# Patient Record
Sex: MALE | Race: WHITE | NOT HISPANIC OR LATINO | Employment: PART TIME | URBAN - NONMETROPOLITAN AREA
[De-identification: names, ages, dates, MRNs, and addresses within clinical notes are randomized per-mention and may not be internally consistent; named-entity substitution may affect disease eponyms.]

---

## 2021-01-12 ENCOUNTER — IMMUNIZATION (OUTPATIENT)
Dept: VACCINE CLINIC | Facility: HOSPITAL | Age: 41
End: 2021-01-12

## 2021-01-12 PROCEDURE — 0011A: CPT | Performed by: INTERNAL MEDICINE

## 2021-01-12 PROCEDURE — 91301 HC SARSCO02 VAC 100MCG/0.5ML IM: CPT | Performed by: INTERNAL MEDICINE

## 2021-02-09 ENCOUNTER — IMMUNIZATION (OUTPATIENT)
Dept: VACCINE CLINIC | Facility: HOSPITAL | Age: 41
End: 2021-02-09

## 2021-02-09 PROCEDURE — 91301 HC SARSCO02 VAC 100MCG/0.5ML IM: CPT | Performed by: INTERNAL MEDICINE

## 2021-02-09 PROCEDURE — 0012A: CPT | Performed by: INTERNAL MEDICINE

## 2022-02-08 ENCOUNTER — OFFICE VISIT (OUTPATIENT)
Dept: ORTHOPEDIC SURGERY | Facility: CLINIC | Age: 42
End: 2022-02-08

## 2022-02-08 VITALS — BODY MASS INDEX: 37.55 KG/M2 | HEIGHT: 71 IN | RESPIRATION RATE: 18 BRPM | WEIGHT: 268.2 LBS

## 2022-02-08 DIAGNOSIS — R29.898 KNEE CLICKING: ICD-10-CM

## 2022-02-08 DIAGNOSIS — M25.561 ARTHRALGIA OF RIGHT KNEE: Primary | ICD-10-CM

## 2022-02-08 PROCEDURE — 99203 OFFICE O/P NEW LOW 30 MIN: CPT | Performed by: PHYSICIAN ASSISTANT

## 2022-02-08 NOTE — PROGRESS NOTES
Subjective   Patient ID: Harmeet Banda is a 41 y.o. right hand dominant male  Pain of the Right Knee (Reports in 2021, he was hiking in full gear while on  duty and twisted knee when he stepped on uneven ground, no tx)         History of Present Illness    Presents with right knee pain that began 2021 while hiking on eneven terrain with full gear on  duty. He has since noticed pain, swelling and clicking to right knee.   On occasion he feels like the right knee wants to give out.         History reviewed. No pertinent past medical history.     Past Surgical History:   Procedure Laterality Date   • TONSILLECTOMY AND ADENOIDECTOMY         Family History   Problem Relation Age of Onset   • Diabetes Mother    • Diabetes Maternal Grandmother    • Diabetes Maternal Grandfather    • Cancer Maternal Grandfather    • Cancer Paternal Grandmother         colon       Social History     Socioeconomic History   • Marital status: Single   Tobacco Use   • Smoking status: Former Smoker     Years: 10.00     Quit date:      Years since quittin.1   • Smokeless tobacco: Never Used   Vaping Use   • Vaping Use: Never used   Substance and Sexual Activity   • Alcohol use: Yes     Comment: socially   • Drug use: Defer   • Sexual activity: Defer       No current outpatient medications on file.    No Known Allergies    Review of Systems   Constitutional: Negative for diaphoresis, fever and unexpected weight change.   HENT: Negative for dental problem and sore throat.    Eyes: Negative for visual disturbance.   Respiratory: Negative for shortness of breath.    Cardiovascular: Negative for chest pain.   Gastrointestinal: Negative for abdominal pain, constipation, diarrhea, nausea and vomiting.   Genitourinary: Negative for difficulty urinating and frequency.   Musculoskeletal: Positive for arthralgias (right knee).   Neurological: Negative for headaches.   Hematological: Does not bruise/bleed easily.  "      I have reviewed the medical and surgical history, family history, social history, medications, and/or allergies, and the review of systems of this report.    Objective   Resp 18   Ht 180.3 cm (71\")   Wt 122 kg (268 lb 3.2 oz)   BMI 37.41 kg/m²    Physical Exam  Vitals and nursing note reviewed.   Cardiovascular:      Pulses: Normal pulses.   Musculoskeletal:      Right knee:      Instability Tests: Medial Rayo test positive. Lateral Rayo test negative.   Neurological:      Mental Status: He is oriented to person, place, and time.       Right Knee Exam     Range of Motion   Extension: 0   Flexion: 120     Tests   Rayo:  Medial - positive Lateral - negative  Varus: negative Valgus: negative  Drawer:  Anterior - negative    Posterior - negative    Other   Erythema: absent  Sensation: normal  Pulse: present           Extremity DVT signs are negative on physical exam with negative Mona sign, no calf pain, no palpable cords and no skin tone change   Neurologic Exam     Mental Status   Oriented to person, place, and time.              Assessment/Plan   Independent Review of Radiographic Studies:    X-ray of the right knee 2 view weightbearing performed in the office independently reviewed for the evaluation of knee pain.  No comparison films are available reviewed.  No acute fracture.  There does appear to be mild patellofemoral joint space narrowing      Procedures       Diagnoses and all orders for this visit:    1. Arthralgia of right knee (Primary)  -     XR Knee 1 or 2 View Right; Future  -     MRI Knee Right Without Contrast    2. Knee clicking  -     MRI Knee Right Without Contrast       Orthopedic activities reviewed and patient expressed appreciation  Discussion of orthopedic goals  Risk, benefits, and merits of treatment alternatives reviewed with the patient and questions answered    Recommendations/Plan:  Exercise, medications, injections, other patient advice, and return appointment as " noted.  Patient is encouraged to call or return for any issues or concerns.    Follow up after mri     Patient agreeable to call or return sooner for any concerns.                 EMR Dragon-transcription disclaimer:  This encounter note is an electronic transcription of spoken language to printed text.  Electronic transcription of spoken language may permit erroneous or at times nonsensical words or phrases to be inadvertently transcribed.  Although I have reviewed the note for such errors, some may still exist

## 2022-02-09 ENCOUNTER — TELEPHONE (OUTPATIENT)
Dept: ORTHOPEDIC SURGERY | Facility: CLINIC | Age: 42
End: 2022-02-09

## 2022-02-09 ENCOUNTER — HOSPITAL ENCOUNTER (OUTPATIENT)
Dept: MRI IMAGING | Facility: HOSPITAL | Age: 42
Discharge: HOME | End: 2022-02-09
Admitting: PHYSICIAN ASSISTANT

## 2022-02-09 PROCEDURE — 73721 MRI JNT OF LWR EXTRE W/O DYE: CPT

## 2022-02-09 NOTE — TELEPHONE ENCOUNTER
Caller: PATIENT     Relationship: SELF     Best call back number: [phone]    Caller requesting test results: PATIENT     What test was performed: MRI OF LEFT KNEE     When was the test performed: 02/09/22- TODAY    Where was the test performed: HOME     Additional notes: PT. GOT IN FOR MRI OF LEFT KNEE TODAY.   ASKING IF [facility] WILL PLEASE CALL HIM WHEN HE GETS RESULTS.

## 2022-02-10 ENCOUNTER — TELEPHONE (OUTPATIENT)
Dept: ORTHOPEDIC SURGERY | Facility: CLINIC | Age: 42
End: 2022-02-10

## 2022-02-10 ENCOUNTER — OFFICE VISIT (OUTPATIENT)
Dept: ORTHOPEDIC SURGERY | Facility: CLINIC | Age: 42
End: 2022-02-10

## 2022-02-10 DIAGNOSIS — M25.561 ARTHRALGIA OF RIGHT KNEE: Primary | ICD-10-CM

## 2022-02-10 NOTE — PROGRESS NOTES
..You have chosen to receive care through a telephone visit. Do you consent to use a telephone visit for your medical care today? Yes

## 2022-02-10 NOTE — TELEPHONE ENCOUNTER
I reviewed the MRI results with the patient via telephone.  There was no evidence of ligament or meniscal tear.  I explained to him I do feel the mechanics of his knee could be the main culprit in his symptoms.  I would like for him to try a cortisone injection in the right knee for patellofemoral disorder as well as formal physical therapy for quad VMO strengthening.   Principal Discharge DX:	Acute congestive heart failure, unspecified heart failure type  Secondary Diagnosis:	Pancytopenia  Secondary Diagnosis:	Metabolic acidosis

## 2022-02-25 ENCOUNTER — OFFICE VISIT (OUTPATIENT)
Dept: INTERNAL MEDICINE | Facility: CLINIC | Age: 42
End: 2022-02-25

## 2022-02-25 VITALS
HEART RATE: 101 BPM | SYSTOLIC BLOOD PRESSURE: 160 MMHG | OXYGEN SATURATION: 99 % | TEMPERATURE: 97.1 F | DIASTOLIC BLOOD PRESSURE: 108 MMHG | HEIGHT: 71 IN | WEIGHT: 269 LBS | BODY MASS INDEX: 37.66 KG/M2

## 2022-02-25 DIAGNOSIS — K76.89 LIVER CYST: ICD-10-CM

## 2022-02-25 DIAGNOSIS — K76.0 NAFLD (NONALCOHOLIC FATTY LIVER DISEASE): ICD-10-CM

## 2022-02-25 DIAGNOSIS — Z76.89 ENCOUNTER TO ESTABLISH CARE: Primary | ICD-10-CM

## 2022-02-25 DIAGNOSIS — R68.89 DECREASED INTEREST IN ACTIVITIES: ICD-10-CM

## 2022-02-25 DIAGNOSIS — Z80.8 FAMILY HISTORY OF SKIN CANCER: ICD-10-CM

## 2022-02-25 DIAGNOSIS — R74.01 ELEVATED ALT MEASUREMENT: ICD-10-CM

## 2022-02-25 DIAGNOSIS — I10 HYPERTENSION, UNSPECIFIED TYPE: ICD-10-CM

## 2022-02-25 DIAGNOSIS — R74.01 ELEVATED AST (SGOT): ICD-10-CM

## 2022-02-25 DIAGNOSIS — R53.83 FATIGUE, UNSPECIFIED TYPE: ICD-10-CM

## 2022-02-25 DIAGNOSIS — F32.0 CURRENT MILD EPISODE OF MAJOR DEPRESSIVE DISORDER WITHOUT PRIOR EPISODE: ICD-10-CM

## 2022-02-25 DIAGNOSIS — E55.9 VITAMIN D DEFICIENCY: ICD-10-CM

## 2022-02-25 DIAGNOSIS — R63.5 WEIGHT GAIN, ABNORMAL: ICD-10-CM

## 2022-02-25 DIAGNOSIS — Z13.0 SCREENING FOR DISORDER OF BLOOD AND BLOOD-FORMING ORGANS: ICD-10-CM

## 2022-02-25 DIAGNOSIS — K14.5 TONGUE, FISSURED: ICD-10-CM

## 2022-02-25 DIAGNOSIS — Z87.2 HISTORY OF BLISTERING SUNBURN: ICD-10-CM

## 2022-02-25 DIAGNOSIS — L98.9 LESION OF SKIN OF SCALP: ICD-10-CM

## 2022-02-25 PROCEDURE — 99204 OFFICE O/P NEW MOD 45 MIN: CPT | Performed by: NURSE PRACTITIONER

## 2022-02-25 PROCEDURE — 93000 ELECTROCARDIOGRAM COMPLETE: CPT | Performed by: NURSE PRACTITIONER

## 2022-02-25 RX ORDER — BUPROPION HYDROCHLORIDE 150 MG/1
150 TABLET ORAL DAILY
Qty: 30 TABLET | Refills: 1 | Status: SHIPPED | OUTPATIENT
Start: 2022-02-25 | End: 2022-03-23 | Stop reason: SDUPTHER

## 2022-02-25 RX ORDER — LISINOPRIL 20 MG/1
20 TABLET ORAL DAILY
Qty: 30 TABLET | Refills: 1 | Status: SHIPPED | OUTPATIENT
Start: 2022-02-25 | End: 2022-02-28

## 2022-02-26 LAB
25(OH)D3+25(OH)D2 SERPL-MCNC: 16.1 NG/ML (ref 30–100)
ALBUMIN SERPL-MCNC: 4.8 G/DL (ref 3.5–5.2)
ALBUMIN/GLOB SERPL: 2.1 G/DL
ALP SERPL-CCNC: 91 U/L (ref 39–117)
ALT SERPL-CCNC: 77 U/L (ref 1–41)
AST SERPL-CCNC: 44 U/L (ref 1–40)
BASOPHILS # BLD AUTO: 0.03 10*3/MM3 (ref 0–0.2)
BASOPHILS NFR BLD AUTO: 0.4 % (ref 0–1.5)
BILIRUB SERPL-MCNC: 0.8 MG/DL (ref 0–1.2)
BUN SERPL-MCNC: 11 MG/DL (ref 6–20)
BUN/CREAT SERPL: 10.3 (ref 7–25)
CALCIUM SERPL-MCNC: 9.8 MG/DL (ref 8.6–10.5)
CHLORIDE SERPL-SCNC: 107 MMOL/L (ref 98–107)
CO2 SERPL-SCNC: 22.2 MMOL/L (ref 22–29)
CREAT SERPL-MCNC: 1.07 MG/DL (ref 0.76–1.27)
EOSINOPHIL # BLD AUTO: 0.2 10*3/MM3 (ref 0–0.4)
EOSINOPHIL NFR BLD AUTO: 2.6 % (ref 0.3–6.2)
ERYTHROCYTE [DISTWIDTH] IN BLOOD BY AUTOMATED COUNT: 12.6 % (ref 12.3–15.4)
FOLATE SERPL-MCNC: 4.3 NG/ML (ref 4.78–24.2)
GLOBULIN SER CALC-MCNC: 2.3 GM/DL
GLUCOSE SERPL-MCNC: 101 MG/DL (ref 65–99)
HCT VFR BLD AUTO: 46.7 % (ref 37.5–51)
HGB BLD-MCNC: 16.5 G/DL (ref 13–17.7)
IMM GRANULOCYTES # BLD AUTO: 0.03 10*3/MM3 (ref 0–0.05)
IMM GRANULOCYTES NFR BLD AUTO: 0.4 % (ref 0–0.5)
IRON SERPL-MCNC: 109 MCG/DL (ref 59–158)
LYMPHOCYTES # BLD AUTO: 3.83 10*3/MM3 (ref 0.7–3.1)
LYMPHOCYTES NFR BLD AUTO: 49 % (ref 19.6–45.3)
MCH RBC QN AUTO: 32.2 PG (ref 26.6–33)
MCHC RBC AUTO-ENTMCNC: 35.3 G/DL (ref 31.5–35.7)
MCV RBC AUTO: 91 FL (ref 79–97)
MONOCYTES # BLD AUTO: 0.46 10*3/MM3 (ref 0.1–0.9)
MONOCYTES NFR BLD AUTO: 5.9 % (ref 5–12)
NEUTROPHILS # BLD AUTO: 3.26 10*3/MM3 (ref 1.7–7)
NEUTROPHILS NFR BLD AUTO: 41.7 % (ref 42.7–76)
NRBC BLD AUTO-RTO: 0 /100 WBC (ref 0–0.2)
PLATELET # BLD AUTO: 241 10*3/MM3 (ref 140–450)
POTASSIUM SERPL-SCNC: 4.1 MMOL/L (ref 3.5–5.2)
PROT SERPL-MCNC: 7.1 G/DL (ref 6–8.5)
RBC # BLD AUTO: 5.13 10*6/MM3 (ref 4.14–5.8)
SODIUM SERPL-SCNC: 140 MMOL/L (ref 136–145)
T4 FREE SERPL-MCNC: 1.28 NG/DL (ref 0.93–1.7)
TSH SERPL DL<=0.005 MIU/L-ACNC: 2.3 UIU/ML (ref 0.27–4.2)
VIT B12 SERPL-MCNC: 210 PG/ML (ref 211–946)
WBC # BLD AUTO: 7.81 10*3/MM3 (ref 3.4–10.8)

## 2022-02-28 RX ORDER — LISINOPRIL 20 MG/1
20 TABLET ORAL DAILY
Qty: 90 TABLET | Refills: 1 | Status: SHIPPED | OUTPATIENT
Start: 2022-02-28 | End: 2022-03-23 | Stop reason: SINTOL

## 2022-02-28 RX ORDER — ERGOCALCIFEROL 1.25 MG/1
50000 CAPSULE ORAL WEEKLY
Qty: 12 CAPSULE | Refills: 0 | Status: SHIPPED | OUTPATIENT
Start: 2022-02-28 | End: 2022-05-17

## 2022-03-22 ENCOUNTER — HOSPITAL ENCOUNTER (OUTPATIENT)
Dept: ULTRASOUND IMAGING | Facility: HOSPITAL | Age: 42
Discharge: HOME | End: 2022-03-22
Admitting: NURSE PRACTITIONER

## 2022-03-22 DIAGNOSIS — R74.01 ELEVATED AST (SGOT): ICD-10-CM

## 2022-03-22 DIAGNOSIS — R74.01 ELEVATED ALT MEASUREMENT: ICD-10-CM

## 2022-03-22 PROCEDURE — 76705 ECHO EXAM OF ABDOMEN: CPT

## 2022-03-23 ENCOUNTER — OFFICE VISIT (OUTPATIENT)
Dept: INTERNAL MEDICINE | Facility: CLINIC | Age: 42
End: 2022-03-23

## 2022-03-23 VITALS
TEMPERATURE: 97.9 F | HEART RATE: 84 BPM | DIASTOLIC BLOOD PRESSURE: 98 MMHG | BODY MASS INDEX: 36.68 KG/M2 | WEIGHT: 262 LBS | HEIGHT: 71 IN | SYSTOLIC BLOOD PRESSURE: 138 MMHG | OXYGEN SATURATION: 99 %

## 2022-03-23 DIAGNOSIS — Z87.2 HISTORY OF BLISTERING SUNBURN: ICD-10-CM

## 2022-03-23 DIAGNOSIS — L81.8 EXTENSIVE TATTOOS: ICD-10-CM

## 2022-03-23 DIAGNOSIS — E55.9 VITAMIN D DEFICIENCY: ICD-10-CM

## 2022-03-23 DIAGNOSIS — Z80.8 FAMILY HISTORY OF BASAL CELL CARCINOMA: ICD-10-CM

## 2022-03-23 DIAGNOSIS — I10 PRIMARY HYPERTENSION: ICD-10-CM

## 2022-03-23 DIAGNOSIS — K76.0 NAFLD (NONALCOHOLIC FATTY LIVER DISEASE): ICD-10-CM

## 2022-03-23 DIAGNOSIS — F32.0 CURRENT MILD EPISODE OF MAJOR DEPRESSIVE DISORDER WITHOUT PRIOR EPISODE: ICD-10-CM

## 2022-03-23 DIAGNOSIS — K76.89 LIVER CYST: ICD-10-CM

## 2022-03-23 DIAGNOSIS — E66.01 CLASS 2 SEVERE OBESITY DUE TO EXCESS CALORIES WITH SERIOUS COMORBIDITY AND BODY MASS INDEX (BMI) OF 36.0 TO 36.9 IN ADULT: ICD-10-CM

## 2022-03-23 DIAGNOSIS — Z00.00 ANNUAL PHYSICAL EXAM: Primary | ICD-10-CM

## 2022-03-23 PROCEDURE — 99396 PREV VISIT EST AGE 40-64: CPT | Performed by: NURSE PRACTITIONER

## 2022-03-23 RX ORDER — LOSARTAN POTASSIUM 100 MG/1
100 TABLET ORAL DAILY
Qty: 90 TABLET | Refills: 1 | Status: SHIPPED | OUTPATIENT
Start: 2022-03-23 | End: 2022-10-05 | Stop reason: SDUPTHER

## 2022-03-23 RX ORDER — BUPROPION HYDROCHLORIDE 150 MG/1
150 TABLET ORAL DAILY
Qty: 90 TABLET | Refills: 1 | Status: SHIPPED | OUTPATIENT
Start: 2022-03-23 | End: 2022-10-05 | Stop reason: SDUPTHER

## 2022-03-23 NOTE — PROGRESS NOTES
Chief Complaint   Patient presents with   • Annual Exam       Harmeet Banda is a 42 y.o. male and is here for a comprehensive physical exam.     HTN: taking lisinopril 20 mg daily since prescribed on 2/25/22.  Has noticed a slight irritating cough, phlegm and nasal congestion have increased since he started taking it. Checks BP at home, has been improved but not WNL.  Denies chest pain, dyspnea, orthopnea, palpitations, lower extremity edema, confusion, headaches, weakness, visual disturbances.    Lost 7 pounds since starting wellbutrin 150 mg for depression, anhedonia.  He has felt a little less depressed, wants to continue taking wellbutrin at this dose as he knows it takes a while to feel full effect of medication.  Denies difficulty concentrating, impaired memory, SI, HI, panic attacks.     ALT, AST elevated on surveillance labs.  Liver US showed fatty liver disease, liver cyst.  Referred to GI.  He has stopped drinking bourbon throughout the week.  Advised to eat a heart healthy/low carb diet, be physically active most days, lose weight.       Referred to derm for scalp lesion at last appointment.  His mom had a similar lesion, biopsy showed basal cell carcinoma.  His appointment scheduled next month.       History:  Erectile dysfunction  no  Nocturia  no    Do you take any herbs or supplements that were not prescribed by a doctor? Vit B, MVN    Health Habits:  Dental Exam. not up to date - will schedule appointment in June  Eye Exam. up to date, wears glasses to drive at night  Exercise: 0 times/week.  Current exercise activities include: none   Diet:  Healthier, cooking at home    Health Maintenance   Topic Date Due   • TDAP/TD VACCINES (1 - Tdap) Never done   • COVID-19 Vaccine (3 - Booster for Moderna series) 07/09/2021   • ANNUAL PHYSICAL  03/24/2023   • COLORECTAL CANCER SCREENING  01/01/2027   • INFLUENZA VACCINE  Completed   • Hepatitis B  Aged Out   • Pneumococcal Vaccine 0-64  Aged Out   •  "HEPATITIS C SCREENING  Discontinued       PMH, PSH, SocHx, FamHx, Allergies, and Medications: Reviewed and updated in the Visit Navigator.     Allergies   Allergen Reactions   • Lisinopril Other (See Comments)     Nasal congestion, phlegm     Past Medical History:   Diagnosis Date   • Colon polyp may 2017     Past Surgical History:   Procedure Laterality Date   • ADENOIDECTOMY     • COLONOSCOPY     • TONSILLECTOMY AND ADENOIDECTOMY       Social History     Socioeconomic History   • Marital status: Single   Tobacco Use   • Smoking status: Former Smoker     Packs/day: 0.50     Years: 10.00     Pack years: 5.00     Types: Cigarettes, Pipe, Cigars     Start date: 1994     Quit date:      Years since quittin.2   • Smokeless tobacco: Never Used   Vaping Use   • Vaping Use: Never used   Substance and Sexual Activity   • Alcohol use: Yes     Alcohol/week: 6.0 standard drinks     Types: 6 Shots of liquor per week     Comment: socially   • Drug use: Never   • Sexual activity: Not Currently     Partners: Male     Birth control/protection: None     Family History   Problem Relation Age of Onset   • Diabetes Mother    • Hyperlipidemia Mother    • Diabetes Maternal Grandmother    • Diabetes Maternal Grandfather    • Cancer Maternal Grandfather         lung   • Cancer Paternal Grandmother         colon   • Stroke Paternal Grandmother    • Hyperlipidemia Father        Review of Systems  Review of Systems   All other systems reviewed and are negative.      Objective   /98   Pulse 84   Temp 97.9 °F (36.6 °C)   Ht 180.3 cm (70.98\")   Wt 119 kg (262 lb)   SpO2 99%   BMI 36.56 kg/m²   Body mass index is 36.56 kg/m².    Physical Exam  Constitutional:       Appearance: He is well-developed. He is not ill-appearing.   HENT:      Head: Normocephalic.      Right Ear: Tympanic membrane, ear canal and external ear normal.      Left Ear: Tympanic membrane, ear canal and external ear normal.      Nose: Nose " normal.      Mouth/Throat:      Mouth: Mucous membranes are moist.      Pharynx: Oropharynx is clear. Uvula midline.      Comments: Fissure/scarring in center of tongue  Eyes:      Extraocular Movements: Extraocular movements intact.      Conjunctiva/sclera: Conjunctivae normal.      Pupils: Pupils are equal, round, and reactive to light.   Neck:      Thyroid: No thyromegaly.      Vascular: No carotid bruit.   Cardiovascular:      Rate and Rhythm: Normal rate and regular rhythm.      Pulses:           Radial pulses are 2+ on the right side and 2+ on the left side.        Dorsalis pedis pulses are 2+ on the right side and 2+ on the left side.      Heart sounds: Normal heart sounds.   Pulmonary:      Effort: Pulmonary effort is normal.      Breath sounds: Normal breath sounds.   Abdominal:      General: Bowel sounds are normal.      Palpations: Abdomen is soft.      Tenderness: There is no abdominal tenderness.   Musculoskeletal:         General: No tenderness or deformity. Normal range of motion.      Cervical back: Full passive range of motion without pain, normal range of motion and neck supple.      Right lower leg: No edema.      Left lower leg: No edema.   Lymphadenopathy:      Cervical: No cervical adenopathy.   Skin:     General: Skin is warm.      Capillary Refill: Capillary refill takes less than 2 seconds.      Comments: Raised, flesh colored, rough lesion on scalp.  Multiple tattoos, full bilateral sleeves, on both legs   Neurological:      Mental Status: He is alert and oriented to person, place, and time.      Sensory: No sensory deficit.      Coordination: Coordination normal.      Gait: Gait normal.      Comments: CN II-XII normal   Psychiatric:         Attention and Perception: Attention normal.         Mood and Affect: Mood and affect normal.         Speech: Speech normal.         Behavior: Behavior normal.         Thought Content: Thought content normal.         The CVD Risk score (D'Agostino, et  al., 2008) failed to calculate for the following reasons:    Cannot find a previous HDL lab    Cannot find a previous total cholesterol lab    Assessment/Plan   1. Healthy male exam.    2. Patient Counseling: Including but not Limited to the following, when appropriate:  --Nutrition: Stressed importance of moderation in sodium/caffeine intake, saturated fat and cholesterol, caloric balance, sufficient intake of fresh fruits, vegetables, fiber  --Exercise: Stressed the importance of regular exercise.   --Substance Abuse: Discussed cessation/primary prevention of tobacco, alcohol, or other drug use; driving or other dangerous activities under the influence; availability of treatment for abuse, as indicated based on social history.    --Sexuality: Discussed sexually transmitted diseases, partner selection, use of condoms and contraceptive alternatives.   --Injury prevention: Discussed safety belts, safety helmets, smoke detector, smoking near bedding or upholstery.   --Dental health: Discussed importance of regular tooth brushing, flossing, and dental visits.  --Immunizations reviewed.  3. Discussed the patient's BMI with him.  The BMI is above average; BMI management plan is completed  4. Return in about 6 months (around 9/23/2022) for Next scheduled follow up.  5. Age-appropriate Screening Scheduled    Assessment/Plan     Diagnoses and all orders for this visit:    1. Annual physical exam (Primary)    2. Primary hypertension  -     losartan (Cozaar) 100 MG tablet; Take 1 tablet by mouth Daily.  Dispense: 90 tablet; Refill: 1. Changing from lisinopril 20 mg, increasing comparable dose        - Follow heart healthy diet.  Keep sodium intake < 1500 mg per day.  Avoid processed & fast foods.          - Exercise as tolerated, with a goal of 30 minutes of moderate exercise most days.         - Take medications as prescribed.    3. Current mild episode of major depressive disorder without prior episode (HCC)  -      buPROPion XL (Wellbutrin XL) 150 MG 24 hr tablet; Take 1 tablet by mouth Daily.  Dispense: 90 tablet; Refill: 1        - Encouraged to take part in daily physical exercise.          - Eat healthy, well balanced diet; avoid sugary foods or beverages        - Limit alcohol intake        - Ensure good night's sleep by creating calm space in bedroom, avoiding screen time 1-2 hours before bed, no caffeine after 5 pm        - Talk to supportive family and friends, as needed    4. Vitamin D deficiency        - Take prescribed supplement, will take 2000 IU vit D3 after complete    5. NAFLD (nonalcoholic fatty liver disease)        - Low fat, low carb diet.  Increased daily physical activity.  Limit alcohol.      6. Liver cyst        - Referred to GI    7. History of blistering sunburn        - Keep appointment with dermatology     8. Family history of basal cell carcinoma    9. Class 2 severe obesity due to excess calories with serious comorbidity and body mass index (BMI) of 36.0 to 36.9 in adult (HCC)        - Patient's Body mass index is 36.56 kg/m². indicating that he is morbidly obese (BMI > 40 or > 35 with obesity - related health condition). Obesity-related health conditions include the following: hypertension. Obesity is improving with lifestyle modifications. BMI is is above average; BMI management plan is completed. We discussed low calorie, low carb based diet program, portion control, increasing exercise and pharmacologic options including wellbutrin.    10. Extensive tattoos

## 2022-04-27 ENCOUNTER — OFFICE VISIT (OUTPATIENT)
Dept: GASTROENTEROLOGY | Facility: CLINIC | Age: 42
End: 2022-04-27

## 2022-04-27 VITALS
WEIGHT: 261 LBS | HEIGHT: 71 IN | DIASTOLIC BLOOD PRESSURE: 84 MMHG | SYSTOLIC BLOOD PRESSURE: 120 MMHG | BODY MASS INDEX: 36.54 KG/M2

## 2022-04-27 DIAGNOSIS — Z86.010 PERSONAL HISTORY OF COLONIC POLYPS: ICD-10-CM

## 2022-04-27 DIAGNOSIS — K76.0 NAFLD (NONALCOHOLIC FATTY LIVER DISEASE): ICD-10-CM

## 2022-04-27 DIAGNOSIS — K21.9 GASTROESOPHAGEAL REFLUX DISEASE WITHOUT ESOPHAGITIS: ICD-10-CM

## 2022-04-27 DIAGNOSIS — R79.89 ELEVATED LFTS: Primary | ICD-10-CM

## 2022-04-27 DIAGNOSIS — K76.89 LIVER CYST: ICD-10-CM

## 2022-04-27 DIAGNOSIS — Z80.0 FAMILY HISTORY OF COLON CANCER: ICD-10-CM

## 2022-04-27 PROCEDURE — 99204 OFFICE O/P NEW MOD 45 MIN: CPT | Performed by: INTERNAL MEDICINE

## 2022-04-27 RX ORDER — SODIUM CHLORIDE 9 MG/ML
70 INJECTION, SOLUTION INTRAVENOUS CONTINUOUS PRN
Status: CANCELLED | OUTPATIENT
Start: 2022-04-27

## 2022-04-27 RX ORDER — BISACODYL 5 MG/1
20 TABLET, DELAYED RELEASE ORAL ONCE
Qty: 4 TABLET | Refills: 0 | Status: SHIPPED | OUTPATIENT
Start: 2022-04-27 | End: 2022-04-27

## 2022-04-27 RX ORDER — FAMOTIDINE 20 MG/1
20 TABLET, FILM COATED ORAL 2 TIMES DAILY
COMMUNITY

## 2022-04-27 NOTE — PROGRESS NOTES
New Patient Consult      Date: 2022   Patient Name: Harmeet Banda  MRN: [id number]  : 1980     Referring Physician: Crystal Kimbrough, *    Chief Complaint   Patient presents with   • Consult   • Elevated Hepatic Enzymes       History of Present Illness: Harmeet Banda is a 42 y.o. male who is here today to establish care with Gastroenterology for evaluation of elevated liver enzymes.    He had a routine PCP visit recently and had a blood work done which revealed elevated liver enzymes.  Sequently he had a ultrasound abdomen done which revealed a fatty liver disease.  Benign cyst in the right lobe of the liver.  Patient denies any prior history of chronic hepatitis.  He had a prior multiple tattoos.  No famished of any liver disease.  No previous recent blood work to compare.  He drinks at least 3-4 hard liquor minimum 3-4 times per week for many years now.  Denies any current smoking.  Neck smoker.    He deny any abdominal pain, change in bowel habit, hematochezia or melena.  Gained over 30 pounds for the last 2 years.. Pt denies nausea vomiting or odynophagia or dysphagia. There is history of acid reflux occasional on pepcid as needed. There is no history of anemia. No prior history of EGD. Last colonoscopy , had 3-4 poyps removed. Family history of colon cancer- Paternal grand mother had colon Ca at 70s.   No history of any abdominal surgery.    Subjective      Past Medical History:   Past Medical History:   Diagnosis Date   • Colon polyp may 2017       Past Surgical History:   Past Surgical History:   Procedure Laterality Date   • ADENOIDECTOMY     • COLONOSCOPY     • TONSILLECTOMY AND ADENOIDECTOMY         Family History:   Family History   Problem Relation Age of Onset   • Diabetes Mother    • Hyperlipidemia Mother    • Diabetes Maternal Grandmother    • Diabetes Maternal Grandfather    • Cancer Maternal Grandfather         lung   • Cancer Paternal Grandmother          colon   • Stroke Paternal Grandmother    • Hyperlipidemia Father        Social History:   Social History     Socioeconomic History   • Marital status: Single   Tobacco Use   • Smoking status: Former Smoker     Packs/day: 0.50     Years: 10.00     Pack years: 5.00     Types: Cigarettes, Pipe, Cigars     Start date: 1994     Quit date:      Years since quittin.3   • Smokeless tobacco: Never Used   Vaping Use   • Vaping Use: Never used   Substance and Sexual Activity   • Alcohol use: Yes     Alcohol/week: 6.0 standard drinks     Types: 6 Shots of liquor per week     Comment: socially   • Drug use: Never   • Sexual activity: Not Currently     Partners: Male     Birth control/protection: None         Current Outpatient Medications:   •  B Complex-Folic Acid (B COMPLEX-VITAMIN B12 PO), Take  by mouth., Disp: , Rfl:   •  buPROPion XL (Wellbutrin XL) 150 MG 24 hr tablet, Take 1 tablet by mouth Daily., Disp: 90 tablet, Rfl: 1  •  famotidine (PEPCID) 20 MG tablet, Take 20 mg by mouth 2 (Two) Times a Day., Disp: , Rfl:   •  losartan (Cozaar) 100 MG tablet, Take 1 tablet by mouth Daily., Disp: 90 tablet, Rfl: 1  •  vitamin D (ERGOCALCIFEROL) 1.25 MG (54701 UT) capsule capsule, Take 1 capsule by mouth 1 (One) Time Per Week for 12 doses., Disp: 12 capsule, Rfl: 0  •  bisacodyl (DULCOLAX) 5 MG EC tablet, Take 4 tablets by mouth 1 (One) Time for 1 dose. Please see prep instructions from office., Disp: 4 tablet, Rfl: 0  •  magnesium citrate solution, Dispense three bottles for colonoscopy prep. Please see prep instructions from office., Disp: 888 mL, Rfl: 0    Allergies   Allergen Reactions   • Lisinopril Other (See Comments)     Nasal congestion, phlegm       Review of Systems:   Review of Systems   Constitutional: Negative for appetite change, fatigue, fever and unexpected weight loss.   HENT: Negative for trouble swallowing.    Gastrointestinal: Positive for GERD. Negative for abdominal distention, abdominal pain,  "anal bleeding, blood in stool, constipation, diarrhea, nausea, rectal pain, vomiting and indigestion.       The following portions of the patient's history were reviewed and updated as appropriate: allergies, current medications, past family history, past medical history, past social history, past surgical history and problem list.    Objective     Physical Exam:  Vital Signs:   Vitals:    04/27/22 1455   BP: 120/84   Weight: 118 kg (261 lb)   Height: 180.3 cm (71\")       Physical Exam  Constitutional:       Appearance: He is obese.   HENT:      Head: Normocephalic and atraumatic.   Eyes:      Conjunctiva/sclera: Conjunctivae normal.   Abdominal:      General: Abdomen is flat. There is no distension.      Palpations: There is no mass.      Tenderness: There is no abdominal tenderness. There is no guarding or rebound.      Hernia: No hernia is present.   Musculoskeletal:      Cervical back: Normal range of motion and neck supple.   Neurological:      Mental Status: He is alert.           Results Review:   I have reviewed the patient's new clinical and imaging results and agree with the interpretation.     Office Visit on 02/25/2022   Component Date Value Ref Range Status   • Glucose 02/25/2022 101 (A) 65 - 99 mg/dL Final   • BUN 02/25/2022 11  6 - 20 mg/dL Final   • Creatinine 02/25/2022 1.07  0.76 - 1.27 mg/dL Final   • eGFR Non  Am 02/25/2022 76  >60 mL/min/1.73 Final    Comment: GFR Normal >60  Chronic Kidney Disease <60  Kidney Failure <15     • eGFR  Am 02/25/2022 92  >60 mL/min/1.73 Final   • BUN/Creatinine Ratio 02/25/2022 10.3  7.0 - 25.0 Final   • Sodium 02/25/2022 140  136 - 145 mmol/L Final   • Potassium 02/25/2022 4.1  3.5 - 5.2 mmol/L Final   • Chloride 02/25/2022 107  98 - 107 mmol/L Final   • Total CO2 02/25/2022 22.2  22.0 - 29.0 mmol/L Final   • Calcium 02/25/2022 9.8  8.6 - 10.5 mg/dL Final   • Total Protein 02/25/2022 7.1  6.0 - 8.5 g/dL Final   • Albumin 02/25/2022 4.80  3.50 - 5.20 " g/dL Final   • Globulin 02/25/2022 2.3  gm/dL Final   • A/G Ratio 02/25/2022 2.1  g/dL Final   • Total Bilirubin 02/25/2022 0.8  0.0 - 1.2 mg/dL Final   • Alkaline Phosphatase 02/25/2022 91  39 - 117 U/L Final   • AST (SGOT) 02/25/2022 44 (A) 1 - 40 U/L Final   • ALT (SGPT) 02/25/2022 77 (A) 1 - 41 U/L Final   • Free T4 02/25/2022 1.28  0.93 - 1.70 ng/dL Final    Results may be falsely increased if patient taking Biotin.   • TSH 02/25/2022 2.300  0.270 - 4.200 uIU/mL Final   • 25 Hydroxy, Vitamin D 02/25/2022 16.1 (A) 30.0 - 100.0 ng/ml Final    Comment: Results may be falsely increased if patient taking Biotin.  Reference Range for Total Vitamin D 25(OH)  Deficiency <20.0 ng/mL  Insufficiency 21-29 ng/mL  Sufficiency  ng/mL  Toxicity >100 ng/ml     • Iron 02/25/2022 109  59 - 158 mcg/dL Final   • Vitamin B-12 02/25/2022 210 (A) 211 - 946 pg/mL Final    Results may be falsely increased if patient taking Biotin.   • Folate 02/25/2022 4.30 (A) 4.78 - 24.20 ng/mL Final    Results may be falsely increased if patient taking Biotin.   • WBC 02/25/2022 7.81  3.40 - 10.80 10*3/mm3 Final   • RBC 02/25/2022 5.13  4.14 - 5.80 10*6/mm3 Final   • Hemoglobin 02/25/2022 16.5  13.0 - 17.7 g/dL Final   • Hematocrit 02/25/2022 46.7  37.5 - 51.0 % Final   • MCV 02/25/2022 91.0  79.0 - 97.0 fL Final   • MCH 02/25/2022 32.2  26.6 - 33.0 pg Final   • MCHC 02/25/2022 35.3  31.5 - 35.7 g/dL Final   • RDW 02/25/2022 12.6  12.3 - 15.4 % Final   • Platelets 02/25/2022 241  140 - 450 10*3/mm3 Final   • Neutrophil Rel % 02/25/2022 41.7 (A) 42.7 - 76.0 % Final   • Lymphocyte Rel % 02/25/2022 49.0 (A) 19.6 - 45.3 % Final   • Monocyte Rel % 02/25/2022 5.9  5.0 - 12.0 % Final   • Eosinophil Rel % 02/25/2022 2.6  0.3 - 6.2 % Final   • Basophil Rel % 02/25/2022 0.4  0.0 - 1.5 % Final   • Neutrophils Absolute 02/25/2022 3.26  1.70 - 7.00 10*3/mm3 Final   • Lymphocytes Absolute 02/25/2022 3.83 (A) 0.70 - 3.10 10*3/mm3 Final   • Monocytes Absolute  02/25/2022 0.46  0.10 - 0.90 10*3/mm3 Final   • Eosinophils Absolute 02/25/2022 0.20  0.00 - 0.40 10*3/mm3 Final   • Basophils Absolute 02/25/2022 0.03  0.00 - 0.20 10*3/mm3 Final   • Immature Granulocyte Rel % 02/25/2022 0.4  0.0 - 0.5 % Final   • Immature Grans Absolute 02/25/2022 0.03  0.00 - 0.05 10*3/mm3 Final   • nRBC 02/25/2022 0.0  0.0 - 0.2 /100 WBC Final      US Liver    Result Date: 3/22/2022  1. Fatty infiltration of the liver. 2. Cyst in the right lobe of the liver measuring 3.4 cm.     Images were reviewed, interpreted, and dictated by Dr. Yulia Houston M.D. Transcribed by Ai Jimenez PA-C.  This report was signed and finalized on 3/22/2022 11:27 AM by Yulia Houston M.D..    MRI Knee Right Without Contrast    Result Date: 2/9/2022  No ligamentous or meniscal tear.  This report was finalized on 2/9/2022 10:12 AM by Shay Sandoval MD.      Assessment / Plan      Assessment & Plan:  1. Elevated LFTs  2. NAFLD (nonalcoholic fatty liver disease)  3.  Obesity with a BMI 36  4.  Benign liver cysts   suspected combined nonalcoholic and alcohol related fatty liver disease.  No prior history of chronic hepatitis.  No recent lab work to compare his liver enzymes.  He has a hepatocellular pattern of elevated liver enzymes mostly indicating nonalcoholic fatty liver disease.  His recent ultrasound showed fatty liver disease with the 3.4 cm size benign liver cyst.  No further work-up needed for the liver cyst.    We will get a basic liver work-up to rule out other etiology for elevated liver enzymes  Discussion regarding lifestyle changes, dietary changes including cutting down alcohol use and losing weight less than 200 pounds.  Nonsurgical weight management program referral offered but patient declined at this time.   Mediterranean diet discussed with the patient.    - Alpha - 1 - Antitrypsin; Future  - KOLTON; Future  - Anti-Smooth Muscle Antibody Titer; Future  - Ceruloplasmin; Future  - Ferritin;  Future  - Hepatitis A Antibody, Total; Future  - Hepatitis B Core Antibody, Total; Future  - Hepatitis B Surface Antibody; Future  - Hepatitis B Surface Antigen; Future  - Hepatitis C Antibody; Future  - Iron Profile; Future  - Mitochondrial Antibodies, M2; Future  - Protime-INR; Future  - HAYDEN Fibrosure; Future    5. Gastroesophageal reflux disease without esophagitis  He has occasional reflux symptoms and takes Pepcid as needed with good symptom relief.  Dietary changes discussed  We will simply monitor for now    6. Personal history of colonic polyps  7. Family history of colon cancer  He had a colonoscopy done in 2017 and had at least a 4 polyps removed which was done in [place], details available.  His paternal grandmother also had a colon cancer at the age of 70s.  He is due for surveillance colonoscopy now and will schedule the same.    The indications, technique, alternatives and potential risk and complications were discussed with the patient including but not limited to bleeding, bowel perforations, missing lesions and anesthetic complications. The patient understands and wishes to proceed with the procedure and has given their verbal consent. Written patient education information was given to the patient.   The patient will call if they have further questions before procedure.       Follow Up:   Return in about 3 months (around 7/27/2022).    Tejal Heaton MD  [facility]  4/27/2022  17:42 EDT    Please note that portions of this note may have been completed with a voice recognition program.

## 2022-04-29 PROBLEM — Z80.0 FAMILY HISTORY OF COLON CANCER: Status: ACTIVE | Noted: 2022-04-29

## 2022-04-29 PROBLEM — Z86.010 PERSONAL HISTORY OF COLONIC POLYPS: Status: ACTIVE | Noted: 2022-04-29

## 2022-05-17 ENCOUNTER — LAB (OUTPATIENT)
Dept: LAB | Facility: HOSPITAL | Age: 42
End: 2022-05-17

## 2022-05-17 ENCOUNTER — PATIENT MESSAGE (OUTPATIENT)
Dept: INTERNAL MEDICINE | Facility: CLINIC | Age: 42
End: 2022-05-17

## 2022-05-17 DIAGNOSIS — Z11.1 TUBERCULOSIS SCREENING: ICD-10-CM

## 2022-05-17 DIAGNOSIS — R79.89 ELEVATED LFTS: ICD-10-CM

## 2022-05-17 DIAGNOSIS — Z11.1 TUBERCULOSIS SCREENING: Primary | ICD-10-CM

## 2022-05-17 DIAGNOSIS — K76.0 NAFLD (NONALCOHOLIC FATTY LIVER DISEASE): ICD-10-CM

## 2022-05-17 LAB
FERRITIN SERPL-MCNC: 195 NG/ML (ref 30–400)
HBV SURFACE AB SER RIA-ACNC: REACTIVE
HCV AB SER DONR QL: NORMAL
INR PPP: 0.94 (ref 0.9–1.1)
PROTHROMBIN TIME: 12.9 SECONDS (ref 12.5–14.5)

## 2022-05-17 PROCEDURE — 82977 ASSAY OF GGT: CPT

## 2022-05-17 PROCEDURE — 82247 BILIRUBIN TOTAL: CPT

## 2022-05-17 PROCEDURE — 82947 ASSAY GLUCOSE BLOOD QUANT: CPT

## 2022-05-17 PROCEDURE — 86803 HEPATITIS C AB TEST: CPT

## 2022-05-17 PROCEDURE — 86708 HEPATITIS A ANTIBODY: CPT

## 2022-05-17 PROCEDURE — 84460 ALANINE AMINO (ALT) (SGPT): CPT

## 2022-05-17 PROCEDURE — 86038 ANTINUCLEAR ANTIBODIES: CPT

## 2022-05-17 PROCEDURE — 82390 ASSAY OF CERULOPLASMIN: CPT

## 2022-05-17 PROCEDURE — 86480 TB TEST CELL IMMUN MEASURE: CPT

## 2022-05-17 PROCEDURE — 83883 ASSAY NEPHELOMETRY NOT SPEC: CPT

## 2022-05-17 PROCEDURE — 84450 TRANSFERASE (AST) (SGOT): CPT

## 2022-05-17 PROCEDURE — 87340 HEPATITIS B SURFACE AG IA: CPT

## 2022-05-17 PROCEDURE — 86381 MITOCHONDRIAL ANTIBODY EACH: CPT

## 2022-05-17 PROCEDURE — 85610 PROTHROMBIN TIME: CPT

## 2022-05-17 PROCEDURE — 82728 ASSAY OF FERRITIN: CPT

## 2022-05-17 PROCEDURE — 86015 ACTIN ANTIBODY EACH: CPT

## 2022-05-17 PROCEDURE — 86706 HEP B SURFACE ANTIBODY: CPT

## 2022-05-17 PROCEDURE — 84466 ASSAY OF TRANSFERRIN: CPT

## 2022-05-17 PROCEDURE — 82465 ASSAY BLD/SERUM CHOLESTEROL: CPT

## 2022-05-17 PROCEDURE — 36415 COLL VENOUS BLD VENIPUNCTURE: CPT

## 2022-05-17 PROCEDURE — 84478 ASSAY OF TRIGLYCERIDES: CPT

## 2022-05-17 PROCEDURE — 83010 ASSAY OF HAPTOGLOBIN QUANT: CPT

## 2022-05-17 PROCEDURE — 82172 ASSAY OF APOLIPOPROTEIN: CPT

## 2022-05-17 PROCEDURE — 86704 HEP B CORE ANTIBODY TOTAL: CPT

## 2022-05-17 PROCEDURE — 83540 ASSAY OF IRON: CPT

## 2022-05-17 NOTE — TELEPHONE ENCOUNTER
Crystal Reynolds MA 5/17/2022 2:58 PM EDT    Please advise   ----- Message -----  From: Harmeet Banda  Sent: 5/17/2022 2:50 PM EDT  To: Chris George [facility]  Subject: TB test     Good Afternoon, I have some blood work that needs to be drawn for gastro and I need a TB screen done for school. Can you order a quantiFERON TB to be drawn at the same time?    Thank you  Harmeet Banda

## 2022-05-18 LAB
CERULOPLASMIN SERPL-MCNC: 21 MG/DL (ref 16–31)
HBV SURFACE AG SERPL QL IA: NORMAL
IRON 24H UR-MRATE: 101 MCG/DL (ref 59–158)
IRON SATN MFR SERPL: 20 % (ref 20–50)
TIBC SERPL-MCNC: 516 MCG/DL (ref 298–536)
TRANSFERRIN SERPL-MCNC: 346 MG/DL (ref 200–360)

## 2022-05-19 LAB
ANA SER QL: NEGATIVE
HAV AB SER QL IA: POSITIVE
HBV CORE AB SERPL QL IA: NEGATIVE
MITOCHONDRIA M2 IGG SER-ACNC: <20 UNITS (ref 0–20)
SMA IGG SER-ACNC: 6 UNITS (ref 0–19)

## 2022-05-20 LAB
A2 MACROGLOB SERPL-MCNC: 113 MG/DL (ref 110–276)
ALT SERPL W P-5'-P-CCNC: 65 IU/L (ref 0–55)
APO A-I SERPL-MCNC: 118 MG/DL (ref 101–178)
AST SERPL W P-5'-P-CCNC: 40 IU/L (ref 0–40)
BILIRUB SERPL-MCNC: 0.4 MG/DL (ref 0–1.2)
CHOLEST SERPL-MCNC: 212 MG/DL (ref 100–199)
FIBROSIS SCORING:: ABNORMAL
FIBROSIS STAGE SERPL QL: ABNORMAL
GAMMA INTERFERON BACKGROUND BLD IA-ACNC: 0.21 IU/ML
GGT SERPL-CCNC: 46 IU/L (ref 0–65)
GLUCOSE SERPL-MCNC: 88 MG/DL (ref 65–99)
HAPTOGLOB SERPL-MCNC: 40 MG/DL (ref 23–355)
INTERPRETATIONS: (REFERENCE): ABNORMAL
LABORATORY COMMENT REPORT: ABNORMAL
LIVER FIBR SCORE SERPL CALC.FIBROSURE: 0.18 (ref 0–0.21)
M TB IFN-G BLD-IMP: NEGATIVE
M TB IFN-G CD4+ BCKGRND COR BLD-ACNC: 0.27 IU/ML
M TB IFN-G CD4+CD8+ BCKGRND COR BLD-ACNC: 0.22 IU/ML
MITOGEN IGNF BLD-ACNC: >10 IU/ML
NASH SCORING (REFERENCE): ABNORMAL
NECROINFLAMMATORY ACT GRADE SERPL QL: ABNORMAL
NECROINFLAMMATORY ACT SCORE SERPL: 0.5
QUANTIFERON INCUBATION: NORMAL
SERVICE CMNT-IMP: ABNORMAL
SERVICE CMNT-IMP: NORMAL
STEATOSIS GRADE (REFERENCE): ABNORMAL
STEATOSIS GRADING (REFERENCE): ABNORMAL
STEATOSIS SCORE (REFERENCE): 0.78 (ref 0–0.3)
TRIGL SERPL-MCNC: 263 MG/DL (ref 0–149)

## 2022-06-28 ENCOUNTER — TELEPHONE (OUTPATIENT)
Dept: GASTROENTEROLOGY | Facility: CLINIC | Age: 42
End: 2022-06-28

## 2022-06-28 NOTE — TELEPHONE ENCOUNTER
The patient called the office to cancel his colonoscopy scheduled for 07/18/2022.  He did not wish to R/S.

## 2022-10-05 DIAGNOSIS — I10 PRIMARY HYPERTENSION: ICD-10-CM

## 2022-10-05 DIAGNOSIS — F32.0 CURRENT MILD EPISODE OF MAJOR DEPRESSIVE DISORDER WITHOUT PRIOR EPISODE: ICD-10-CM

## 2022-10-05 RX ORDER — LOSARTAN POTASSIUM 100 MG/1
100 TABLET ORAL DAILY
Qty: 90 TABLET | Refills: 1 | Status: SHIPPED | OUTPATIENT
Start: 2022-10-05

## 2022-10-05 RX ORDER — BUPROPION HYDROCHLORIDE 150 MG/1
150 TABLET ORAL DAILY
Qty: 90 TABLET | Refills: 1 | Status: SHIPPED | OUTPATIENT
Start: 2022-10-05

## 2022-10-07 ENCOUNTER — HOSPITAL ENCOUNTER (EMERGENCY)
Facility: HOSPITAL | Age: 42
Discharge: HOME | End: 2022-10-07
Attending: EMERGENCY MEDICINE | Admitting: EMERGENCY MEDICINE

## 2022-10-07 VITALS
RESPIRATION RATE: 18 BRPM | HEART RATE: 80 BPM | SYSTOLIC BLOOD PRESSURE: 170 MMHG | WEIGHT: 250 LBS | HEIGHT: 70 IN | OXYGEN SATURATION: 98 % | DIASTOLIC BLOOD PRESSURE: 102 MMHG | BODY MASS INDEX: 35.79 KG/M2 | TEMPERATURE: 98 F

## 2022-10-07 DIAGNOSIS — S33.5XXA LUMBAR SPRAIN, INITIAL ENCOUNTER: Primary | ICD-10-CM

## 2022-10-07 PROCEDURE — 99282 EMERGENCY DEPT VISIT SF MDM: CPT

## 2022-10-07 RX ORDER — TRAMADOL HYDROCHLORIDE 50 MG/1
50 TABLET ORAL EVERY 6 HOURS PRN
Qty: 12 TABLET | Refills: 0 | Status: SHIPPED | OUTPATIENT
Start: 2022-10-07

## 2022-10-07 RX ORDER — PREDNISONE 20 MG/1
TABLET ORAL
Qty: 10 TABLET | Refills: 0 | Status: SHIPPED | OUTPATIENT
Start: 2022-10-07

## 2022-10-07 NOTE — ED PROVIDER NOTES
Subjective  History of Present Illness:    Chief Complaint: Back pain  History of Present Illness: 42-year-old male who presents with low back pain ongoing over the last few days, seen and evaluated by PCP, had Toradol injection muscle relaxers which were not helping.  Has been using conservative measures, without relief no weakness no numbness no incontinence.  Does have a remote injury from a skydiving accident  Onset: Last few days  Duration: Persistent  Exacerbating / Alleviating factors: See above interventions  Associated symptoms: None      Nurses Notes reviewed and agree, including vitals, allergies, social history and prior medical history.     REVIEW OF SYSTEMS: All systems reviewed and not pertinent unless noted.    Positive for: Low back pain    Negative for: Weakness numbness incontinence trauma    Past Medical History:   Diagnosis Date   • Colon polyp may 2017       Allergies:    Lisinopril      Past Surgical History:   Procedure Laterality Date   • ADENOIDECTOMY     • COLONOSCOPY     • TONSILLECTOMY AND ADENOIDECTOMY           Social History     Socioeconomic History   • Marital status: Single   Tobacco Use   • Smoking status: Former Smoker     Packs/day: 0.50     Years: 10.00     Pack years: 5.00     Types: Cigarettes, Pipe, Cigars     Start date: 1994     Quit date:      Years since quittin.7   • Smokeless tobacco: Never Used   Vaping Use   • Vaping Use: Never used   Substance and Sexual Activity   • Alcohol use: Yes     Alcohol/week: 6.0 standard drinks     Types: 6 Shots of liquor per week     Comment: socially   • Drug use: Never   • Sexual activity: Not Currently     Partners: Male     Birth control/protection: None         Family History   Problem Relation Age of Onset   • Diabetes Mother    • Hyperlipidemia Mother    • Diabetes Maternal Grandmother    • Diabetes Maternal Grandfather    • Cancer Maternal Grandfather         lung   • Cancer Paternal Grandmother          "colon   • Stroke Paternal Grandmother    • Hyperlipidemia Father        Objective  Physical Exam:  BP (!) 170/102 (BP Location: Left arm, Patient Position: Sitting)   Pulse 80   Temp 98 °F (36.7 °C)   Resp 18   Ht 177.8 cm (70\")   Wt 113 kg (250 lb)   SpO2 98%   BMI 35.87 kg/m²    CONSTITUTIONAL: Well developed, nontoxic 42-year-old male,  in no acute distress.  VITAL SIGNS: per nursing, reviewed and noted  SKIN: exposed skin with no rashes, ulcerations or petechiae  EYES: Grossly EOMI, no icterus  ENT: Normal voice.  Patient maintained wearing a mask throughout patient encounter due to coronavirus pandemic  RESPIRATORY:  No increased work of breathing. No retractions.   CARDIOVASCULAR:  regular rate and rhythm, no murmurs.  Good Peripheral pulses. Good cap refill to extremities.   MUSCULOSKELETAL: Midline lumbosacral tenderness with associated bilateral paraspinal spasm.  Pain with straight leg raising at approximately 30 degrees bilaterally.  No saddle anesthesia no foot drop   NEUROLOGIC: Alert, oriented x 3. No gross deficits. GCS 15.   PSYCH: appropriate affect.      Procedures    ED Course:         Lab Results (last 24 hours)     ** No results found for the last 24 hours. **           No radiology results from the last 24 hrs       MDM    Patient presented for evaluation of low back pain, no indications for emergent imaging neurovascularly intact.  Will add short course tramadol, prednisone, continue anti-inflammatories muscle relaxers.  Consider lidocaine patches, outpatient long-term precautions discussed.  Final diagnoses:   Lumbar sprain, initial encounter          Elan Cordova,   10/07/22 1205    "

## 2022-10-08 ENCOUNTER — APPOINTMENT (OUTPATIENT)
Dept: CT IMAGING | Facility: HOSPITAL | Age: 42
End: 2022-10-08

## 2022-10-08 ENCOUNTER — HOSPITAL ENCOUNTER (EMERGENCY)
Facility: HOSPITAL | Age: 42
Discharge: HOME | End: 2022-10-08
Attending: EMERGENCY MEDICINE | Admitting: EMERGENCY MEDICINE

## 2022-10-08 VITALS
TEMPERATURE: 98.4 F | WEIGHT: 250 LBS | BODY MASS INDEX: 35.79 KG/M2 | RESPIRATION RATE: 18 BRPM | OXYGEN SATURATION: 98 % | SYSTOLIC BLOOD PRESSURE: 155 MMHG | DIASTOLIC BLOOD PRESSURE: 104 MMHG | HEART RATE: 76 BPM | HEIGHT: 70 IN

## 2022-10-08 DIAGNOSIS — M54.42 LEFT-SIDED LOW BACK PAIN WITH LEFT-SIDED SCIATICA, UNSPECIFIED CHRONICITY: Primary | ICD-10-CM

## 2022-10-08 PROCEDURE — 72131 CT LUMBAR SPINE W/O DYE: CPT

## 2022-10-08 PROCEDURE — 96372 THER/PROPH/DIAG INJ SC/IM: CPT

## 2022-10-08 PROCEDURE — 25010000002 KETOROLAC TROMETHAMINE PER 15 MG: Performed by: NURSE PRACTITIONER

## 2022-10-08 PROCEDURE — 99283 EMERGENCY DEPT VISIT LOW MDM: CPT

## 2022-10-08 RX ORDER — HYDROCODONE BITARTRATE AND ACETAMINOPHEN 5; 325 MG/1; MG/1
1 TABLET ORAL ONCE
Status: COMPLETED | OUTPATIENT
Start: 2022-10-08 | End: 2022-10-08

## 2022-10-08 RX ORDER — KETOROLAC TROMETHAMINE 30 MG/ML
30 INJECTION, SOLUTION INTRAMUSCULAR; INTRAVENOUS ONCE
Status: COMPLETED | OUTPATIENT
Start: 2022-10-08 | End: 2022-10-08

## 2022-10-08 RX ORDER — LIDOCAINE 50 MG/G
1 PATCH TOPICAL EVERY 24 HOURS
Qty: 10 PATCH | Refills: 0 | Status: SHIPPED | OUTPATIENT
Start: 2022-10-08 | End: 2022-10-18

## 2022-10-08 RX ORDER — BACLOFEN 10 MG/1
10 TABLET ORAL ONCE
Status: COMPLETED | OUTPATIENT
Start: 2022-10-08 | End: 2022-10-08

## 2022-10-08 RX ADMIN — HYDROCODONE BITARTRATE AND ACETAMINOPHEN 1 TABLET: 5; 325 TABLET ORAL at 07:54

## 2022-10-08 RX ADMIN — KETOROLAC TROMETHAMINE 30 MG: 30 INJECTION, SOLUTION INTRAMUSCULAR; INTRAVENOUS at 09:03

## 2022-10-08 RX ADMIN — BACLOFEN 10 MG: 10 TABLET ORAL at 07:54

## 2022-10-08 NOTE — ED NOTES
RN discharging patient at this time. Patient unable to move from lying to sitting position due to back spasms. RN notified IZABEL Hill. Awaiting additional orders.

## 2022-10-08 NOTE — ED PROVIDER NOTES
Subjective   History of Present Illness  Chief Complaint: Low back pain    History of Present Illness: This is a 42-year-old male patient comes into the ED today complaining of low back pain.  Patient states he has a history of back injury after skydiving accident many years ago.  Patient was seen in the emergency room yesterday for similar symptoms and was diagnosed with lumbar strain.  Patient states that his pain has gotten worse and back has gotten stiffer making it harder for him to ambulate    Nurses Notes reviewed and agree, including vitals, allergies, social history and prior medical history.              Review of Systems   Musculoskeletal: Positive for back pain, gait problem and myalgias.   All other systems reviewed and are negative.      Past Medical History:   Diagnosis Date   • Colon polyp may 2017       Allergies   Allergen Reactions   • Lisinopril Other (See Comments)     Nasal congestion, phlegm       Past Surgical History:   Procedure Laterality Date   • ADENOIDECTOMY     • COLONOSCOPY     • TONSILLECTOMY AND ADENOIDECTOMY         Family History   Problem Relation Age of Onset   • Diabetes Mother    • Hyperlipidemia Mother    • Diabetes Maternal Grandmother    • Diabetes Maternal Grandfather    • Cancer Maternal Grandfather         lung   • Cancer Paternal Grandmother         colon   • Stroke Paternal Grandmother    • Hyperlipidemia Father        Social History     Socioeconomic History   • Marital status: Single   Tobacco Use   • Smoking status: Former     Packs/day: 0.50     Years: 10.00     Pack years: 5.00     Types: Cigarettes, Pipe, Cigars     Start date: 1994     Quit date:      Years since quittin.7   • Smokeless tobacco: Never   Vaping Use   • Vaping Use: Never used   Substance and Sexual Activity   • Alcohol use: Yes     Alcohol/week: 6.0 standard drinks     Types: 6 Shots of liquor per week     Comment: socially   • Drug use: Never   • Sexual activity: Not  "Currently     Partners: Male     Birth control/protection: None           Objective   Physical Exam  Vitals and nursing note reviewed.   Constitutional:       Appearance: Normal appearance.   HENT:      Head: Normocephalic and atraumatic.   Eyes:      Extraocular Movements: Extraocular movements intact.      Pupils: Pupils are equal, round, and reactive to light.   Cardiovascular:      Rate and Rhythm: Normal rate and regular rhythm.      Pulses: Normal pulses.      Heart sounds: Normal heart sounds.   Pulmonary:      Effort: Pulmonary effort is normal.      Breath sounds: Normal breath sounds.   Abdominal:      General: Bowel sounds are normal.      Palpations: Abdomen is soft.   Musculoskeletal:         General: Tenderness present. Normal range of motion.      Cervical back: Normal range of motion and neck supple.      Comments: Mild to moderate tenderness to palpation low lumbar spine   Skin:     General: Skin is warm and dry.      Capillary Refill: Capillary refill takes less than 2 seconds.   Neurological:      Mental Status: He is alert.      GCS: GCS eye subscore is 4. GCS verbal subscore is 5. GCS motor subscore is 6.      Cranial Nerves: Cranial nerves are intact.      Sensory: Sensation is intact.      Motor: Motor function is intact.   Psychiatric:         Attention and Perception: Attention and perception normal.         Mood and Affect: Mood and affect normal.         Speech: Speech normal.         Procedures           ED Course      /92 (BP Location: Left arm, Patient Position: Sitting)   Pulse 93   Temp 98.4 °F (36.9 °C) (Oral)   Resp 18   Ht 177.8 cm (70\")   Wt 113 kg (250 lb)   SpO2 98%   BMI 35.87 kg/m²     CT Lumbar Spine Without Contrast    Result Date: 10/8/2022  PROCEDURE: CT LUMBAR SPINE WO CONTRAST-  HISTORY:  Back pain  TECHNIQUE: Thin section axial images were obtained through the lumbar spine without contrast.  Coronal and sagittal reconstruction images were obtained from the " axial data.  COMPARISON: None.  FINDINGS: There is no acute fracture or acute malalignment of the lumbar spine.  Vertebral body height is preserved.  There is mild degenerative disc disease. Annular disc bulges are present at L4-5 and L5-S1. There may be a central protrusion at L3-4.  No acute paraspinal abnormality is identified.  CONCLUSION: 1. No acute osseous abnormality of the lumbar spine. 2. Possible central protrusion at L3-4. Recommend MRI. 3. Mild degenerative disc disease.      1007.51 mGy.cm    This study was performed with techniques to keep radiation doses as low as reasonably achievable (ALARA). Individualized dose reduction techniques using automated exposure control or adjustment of mA and/or kV according to the patient size were employed.  This report was signed and finalized on 10/8/2022 8:39 AM by Iliana Morris MD.                                 MODE reviewed by Elan Cordova DO       [facility]    Final diagnoses:   Left-sided low back pain with left-sided sciatica, unspecified chronicity       ED Disposition  ED Disposition     ED Disposition   Discharge    Condition   Stable    Comment   --             Hugo Antonio MD  [address] [place], [place] [id number] [phone]    In 1 week  Follow-up         Medication List      New Prescriptions    lidocaine 5 %  Commonly known as: LIDODERM  Place 1 patch on the skin as directed by provider Daily for 10 days. Remove & Discard patch within 12 hours or as directed by MD           Where to Get Your Medications      These medications were sent to [id number] - [place], [place] - [address] AT [facility] [address] - [phone]  - [phone] FX  [address] [facility] [address]    Phone: [phone]   · lidocaine 5 %          Rasta Teran APRN  10/08/22 6847       Rasta Teran APRN  10/08/22 9227

## 2023-04-19 DIAGNOSIS — F32.0 CURRENT MILD EPISODE OF MAJOR DEPRESSIVE DISORDER WITHOUT PRIOR EPISODE: ICD-10-CM

## 2023-04-19 DIAGNOSIS — I10 PRIMARY HYPERTENSION: ICD-10-CM

## 2023-04-19 RX ORDER — LOSARTAN POTASSIUM 100 MG/1
100 TABLET ORAL DAILY
Qty: 90 TABLET | Refills: 1 | OUTPATIENT
Start: 2023-04-19

## 2023-04-19 RX ORDER — BUPROPION HYDROCHLORIDE 150 MG/1
150 TABLET ORAL DAILY
Qty: 90 TABLET | Refills: 1 | OUTPATIENT
Start: 2023-04-19